# Patient Record
Sex: FEMALE | Race: OTHER | HISPANIC OR LATINO | URBAN - METROPOLITAN AREA
[De-identification: names, ages, dates, MRNs, and addresses within clinical notes are randomized per-mention and may not be internally consistent; named-entity substitution may affect disease eponyms.]

---

## 2022-10-25 ENCOUNTER — OFFICE (OUTPATIENT)
Dept: URBAN - METROPOLITAN AREA CLINIC 105 | Facility: CLINIC | Age: 67
End: 2022-10-25

## 2022-10-25 VITALS
WEIGHT: 146 LBS | OXYGEN SATURATION: 98 % | DIASTOLIC BLOOD PRESSURE: 92 MMHG | SYSTOLIC BLOOD PRESSURE: 132 MMHG | HEIGHT: 64 IN | HEART RATE: 77 BPM

## 2022-10-25 DIAGNOSIS — K21.9 GASTRO-ESOPHAGEAL REFLUX DISEASE WITHOUT ESOPHAGITIS: ICD-10-CM

## 2022-10-25 DIAGNOSIS — Z86.010 PERSONAL HISTORY OF COLONIC POLYPS: ICD-10-CM

## 2022-10-25 DIAGNOSIS — K59.00 CONSTIPATION, UNSPECIFIED: ICD-10-CM

## 2022-10-25 PROCEDURE — 99204 OFFICE O/P NEW MOD 45 MIN: CPT

## 2022-10-25 RX ORDER — SODIUM PICOSULFATE, MAGNESIUM OXIDE, AND ANHYDROUS CITRIC ACID 10; 3.5; 12 MG/160ML; G/160ML; G/160ML
LIQUID ORAL
Qty: 1 | Refills: 0 | Status: COMPLETED
Start: 2022-10-25 | End: 2022-11-09

## 2022-10-25 RX ORDER — FAMOTIDINE 40 MG/1
TABLET, FILM COATED ORAL
Qty: 90 | Refills: 2 | Status: ACTIVE

## 2022-10-25 NOTE — SERVICEHPINOTES
Ms. Smith is a 67-year-old woman who presents for evaluation of colonoscopy for history of colon polyps and GERD. She denies any significant issues. She denies blood in stool or black stool. She does report having some heartburn for which she takes Tums as needed. She typically has GERD symptoms when she eats spicy foods. She denies dysphagia. She does not have GERD symptoms every day. She has also started taking Metamucil to help move her bowels. Previously she would go 2 weeks without a bowel movement but this has improved with adding a fiber supplement. She does not take any blood thinners. She denies family history of colon cancer, celiac disease or inflammatory bowel disease. She last had a colonoscopy 6/2017 with an 8 mm adenoma in the ascending colon.

## 2022-10-25 NOTE — SERVICENOTES
– Ordered upper endoscopy and colonoscopy
– Take famotidine (Pepcid) 40 mg daily
–For reflux, try eating small, frequent meals, elevating head of bed, not eating 3 hours before bedtime and avoiding trigger foods
– Continue Metamucil for fiber supplement
– Try MiraLAX (polyethylene glycol) as needed for constipation
– Try Squatty Potty If you are a smoker, it is important for your health to stop smoking. Please be aware that second hand smoke is also harmful.

## 2022-11-09 ENCOUNTER — OFFICE (OUTPATIENT)
Dept: URBAN - METROPOLITAN AREA PATHOLOGY 24 | Facility: PATHOLOGY | Age: 67
End: 2022-11-09

## 2022-11-09 ENCOUNTER — AMBULATORY SURGICAL CENTER (OUTPATIENT)
Dept: URBAN - METROPOLITAN AREA SURGERY 26 | Facility: SURGERY | Age: 67
End: 2022-11-09

## 2022-11-09 DIAGNOSIS — Z86.010 PERSONAL HISTORY OF COLONIC POLYPS: ICD-10-CM

## 2022-11-09 DIAGNOSIS — K29.50 UNSPECIFIED CHRONIC GASTRITIS WITHOUT BLEEDING: ICD-10-CM

## 2022-11-09 DIAGNOSIS — D12.3 BENIGN NEOPLASM OF TRANSVERSE COLON: ICD-10-CM

## 2022-11-09 DIAGNOSIS — K20.90 ESOPHAGITIS, UNSPECIFIED WITHOUT BLEEDING: ICD-10-CM

## 2022-11-09 PROCEDURE — 88342 IMHCHEM/IMCYTCHM 1ST ANTB: CPT | Performed by: STUDENT IN AN ORGANIZED HEALTH CARE EDUCATION/TRAINING PROGRAM

## 2022-11-09 PROCEDURE — 43239 EGD BIOPSY SINGLE/MULTIPLE: CPT | Mod: 51

## 2022-11-09 PROCEDURE — 45380 COLONOSCOPY AND BIOPSY: CPT | Mod: PT

## 2022-11-09 PROCEDURE — 88313 SPECIAL STAINS GROUP 2: CPT | Performed by: STUDENT IN AN ORGANIZED HEALTH CARE EDUCATION/TRAINING PROGRAM

## 2022-11-09 PROCEDURE — 88305 TISSUE EXAM BY PATHOLOGIST: CPT | Performed by: STUDENT IN AN ORGANIZED HEALTH CARE EDUCATION/TRAINING PROGRAM

## 2022-12-08 ENCOUNTER — APPOINTMENT (OUTPATIENT)
Dept: URBAN - METROPOLITAN AREA CLINIC 264 | Age: 67
Setting detail: DERMATOLOGY
End: 2022-12-08

## 2022-12-08 DIAGNOSIS — L81.4 OTHER MELANIN HYPERPIGMENTATION: ICD-10-CM

## 2022-12-08 PROCEDURE — OTHER COUNSELING: OTHER

## 2022-12-08 PROCEDURE — OTHER PRESCRIPTION: OTHER

## 2022-12-08 PROCEDURE — 99202 OFFICE O/P NEW SF 15 MIN: CPT

## 2022-12-08 PROCEDURE — OTHER MIPS QUALITY: OTHER

## 2022-12-08 RX ORDER — TRETIONIN 0.25 MG/G
CREAM TOPICAL
Qty: 45 | Refills: 11 | Status: ERX | COMMUNITY
Start: 2022-12-08

## 2022-12-13 ENCOUNTER — RX ONLY (RX ONLY)
Age: 67
End: 2022-12-13

## 2022-12-13 RX ORDER — TRETIONIN 0.25 MG/G
CREAM TOPICAL
Qty: 45 | Refills: 11 | Status: ERX

## 2023-07-06 ENCOUNTER — OFFICE (OUTPATIENT)
Dept: URBAN - METROPOLITAN AREA CLINIC 105 | Facility: CLINIC | Age: 68
End: 2023-07-06

## 2023-07-06 VITALS
DIASTOLIC BLOOD PRESSURE: 100 MMHG | OXYGEN SATURATION: 99 % | HEART RATE: 83 BPM | WEIGHT: 150 LBS | SYSTOLIC BLOOD PRESSURE: 140 MMHG | HEIGHT: 64 IN

## 2023-07-06 DIAGNOSIS — K21.9 GASTRO-ESOPHAGEAL REFLUX DISEASE WITHOUT ESOPHAGITIS: ICD-10-CM

## 2023-07-06 DIAGNOSIS — Z86.010 PERSONAL HISTORY OF COLONIC POLYPS: ICD-10-CM

## 2023-07-06 PROCEDURE — 99213 OFFICE O/P EST LOW 20 MIN: CPT

## 2023-07-06 NOTE — SERVICEHPINOTES
Autumn Smith   is seen today for a follow-up visit.  
slava
brPatient is a 68-year-old woman who presents today for follow-up of GERD and constipation.brShe has reflux symptoms, which is much improved. She states heartburn issues worsens at times. She is taking famotidine 40 mg with benefit but is not taking omeprazole.  She has personal history of colon polyps.  She denies any other changes in the health history since last seen. Constipation improved after stopping medication for urinary frequency. She had bladder sling surgery which has been helpful for urinary incontinence.slava   
brPatient presents for follow-up of GERD and constipation. Patient was seen in clinic 10/25/2022. At that time she had reported some heartburn for which she was taking Tums as needed. She reported some GERD symptoms when eating spicy foods. She was also taking Metamucil for constipation. Patient was prescribed famotidine 40 mg daily for GERD. She was also advised to continue Metamucil, try MiraLAX and Squatty Potty for constipation. At the time of her procedures her constipation was well controlled and she had not needed MiraLAX or Squatty Potty. She had continued to have GERD symptoms. She had an EGD 11/9/2022 with LA grade A esophagitis (bxs wnl, neg for Barretts or EoE), mild antral erythema (bxs w/ mild chronic gastritis, neg for H. pylori or GIM) and normal duodenum (bxs wnl, neg for celiac disease). A prescription was sent in for omeprazole 40 mg daily. She had a colonoscopy 11/9/2022 with normal terminal ileum, 2 diminutive polyps (TA and benign lymphoid polyp) and internal hemorrhoids. She was advised to repeat her colonoscopy in 5 years (11/2027). Patient famotidine was refilled 6/2023.slava

## 2023-07-06 NOTE — SERVICENOTES
- Continue famotidine 40 mg, 30-60 minutes before breakfast.
- If you have worsening reflux symptoms you can take omeprazole 20 mg over-the-counter, 30 to 60 minutes before breakfast
-Discussed lifestyle modifications including eating small, frequent meals, elevating head of bed, not eating 3 hours before bedtime and avoiding trigger foods
- Follow-up in 1 year.  If you move to [place], establish care with new GI provider and you should have a repeat colonoscopy 11/2027